# Patient Record
Sex: MALE | Race: WHITE | Employment: FULL TIME | URBAN - METROPOLITAN AREA
[De-identification: names, ages, dates, MRNs, and addresses within clinical notes are randomized per-mention and may not be internally consistent; named-entity substitution may affect disease eponyms.]

---

## 2021-10-21 PROBLEM — R56.9 SEIZURE-LIKE ACTIVITY (HCC): Status: ACTIVE | Noted: 2021-10-21

## 2021-10-21 NOTE — H&P
MICHAELLE HOSPITALIST  History and Physical     Andrei Prior Patient Status:  Emergency    1975 MRN [id number]   Location [address] [facility] Attending Gavin Pereyra [facility] Day # 0 PCP Kaya Feliz MD     Chief Co Normocephalic atraumatic. Moist mucous membranes. EOM-I. PERRLA. Anicteric. Neck: No lymphadenopathy. No JVD. No carotid bruits. Respiratory: Clear to auscultation bilaterally. No wheezes. No rhonchi. Cardiovascular: S1, S2. Regular rate and rhythm.  No Prophylaxis: Lovenox  · CODE status: Full  · [place]: None  · If COVID testing is negative, may discontinue isolation: Yes     Plan of care discussed with Patient.     Shantell Morris MD  10/21/2021

## 2021-10-21 NOTE — ED INITIAL ASSESSMENT (HPI)
Pt arrives reporting working at home and suddenly he became weak and could not remember anything. Per pt's wife, pt has a history of seizures and she heard a rattling sound behind the door of the room pt was working in.  Pt arrives AOx4 ambulatory and stron

## 2021-10-21 NOTE — ED PROVIDER NOTES
Patient Seen in: [facility] Emergency Department      History   Patient presents with:  Seizure Disorder    Stated Complaint: Seizure    Subjective:   HPI    56-year-old male presents for evaluation after a possible seizure.   Wife states the patient apparent distress  HEENT: Atraumatic, normocephalic. Pupils equal reactive. Extraocular motions intact. Oropharynx clear. Neck: Supple  Lungs: Clear to auscultation bilaterally. Heart: Regular rate and rhythm. Abdomen: Soft, nontender.    Skin: No ra MDM      Medications   sodium chloride 0.9% IV bolus 1,000 mL (1,000 mL Intravenous New Bag 10/21/21 1322)     Discussed with Dr Peg Crandall with Dr Zarina Cote.  Recommend continuous EEG monitoring/ICU    Tech paged stat    Discussed with Dr Tiffany Gillespie

## 2021-10-22 NOTE — PLAN OF CARE
NURSING ADMISSION NOTE      Patient admitted via Cart  Oriented to room. Safety precautions initiated. Bed in low position. Call light in reach.       Problem: Diabetes/Glucose Control  Goal: Glucose maintained within prescribed range  Description: I and description of seizure to MD/LIP  - If seizure occurs, turn patient to side and suction secretions as needed  - Reorient patient post seizure  - Seizure pads on all 4 side rails  - Instruct patient/family to notify RN of any seizure activity  - Instruc

## 2021-10-22 NOTE — CONSULTS
Neurology H&P    Nighat Melara Patient Status:  Inpatient    1975 MRN [id number]   [facility] 7NE-A Attending Flavio Hernandez MD   Hosp Day # 1 PCP Chetna Jordan MD     Subjective:  Nighat Melara is a(n) 42year old male past mouth daily. , Disp: , Rfl:   linaGLIPtin-metFORMIN HCl (JENTADUETO) 2.5-1000 MG Oral Tab, Take 1 tablet by mouth 2 (two) times a day., Disp: , Rfl:   Multiple Vitamin (TAB-A-TRISH) Oral Tab, Take 1 tablet by mouth daily. , Disp: , Rfl:   BIOTIN MAXIMUM OR, T throughout.      Sensory Exam   Light touch normal.   Right arm proprioception: normal  Left arm proprioception: normal    Gait, Coordination, and Reflexes     Coordination   Finger to nose coordination: normal    Tremor   Resting tremor: absent  Intention

## 2021-10-22 NOTE — PAYOR COMM NOTE
--------------  ADMISSION REVIEW     Payor: Santy Lopez 150 PPO  Subscriber #:  [id number]  Authorization Number: [id number]    Admit date: 10/21/21  Admit time:  7:02 PM       History   HPI  57-year-old male presents for evaluation after a possible seizure.   Wife limits   URINALYSIS WITH CULTURE REFLEX - Abnormal; Notable for the following components:    Ketones Urine Trace (*)     Protein Urine 30  (*)     All other components within normal limits   POCT GLUCOSE - Abnormal; Notable for the following components: pulses. Chest and Back: No tenderness or deformity. Abdomen: Soft, nontender, nondistended. Positive bowel sounds. No rebound, guarding or organomegaly. Neurologic: No focal neurological deficits. CNII-XII grossly intact.   Musculoskeletal: Moves all e witnessed seizure.  Continuous EEG monitoring if ok with neuro, neurology evaluation, continue on cardiac telemetry.     #Diabetes mellitus type 2: Insulin correction factor     #Transaminitis     MEDICATIONS ADMINISTERED IN LAST 1 DAY:  enoxaparin (Sean Sierra

## 2021-10-22 NOTE — PROGRESS NOTES
[facility]     Hospitalist Progress Note     Sung Spray Patient Status:  Inpatient    1975 MRN [id number]   [facility] 7NE-A Attending Evette Aldrich MD   Hosp Day # 1 PCP Nichelle Fan MD     Chief Complaint: Syncope Subcutaneous Daily   • Insulin Aspart Pen  1-10 Units Subcutaneous TID AC and HS       Assessment & Plan:      #Syncope versus seizure versus presyncope: Etiology is unclear, he has had multiple events over the past 5 months, initially with a witnessed sei

## 2021-10-22 NOTE — PLAN OF CARE
Assumed care at 0730  A&Ox4  NSR on tele  Denies pain  Seizure precautions maintained  24 EEG in place  MRI ordered, screening completed  Will continue to monitor

## 2021-10-23 NOTE — PROCEDURES
[facility] - ELECTROENCEPHALOGRAM (EEG) REPORT  Patient Name:  Bonnell Boeck   MRN / CSN:  [de-identified] / [id number]   Date of Birth / Age:  9/6/1975 /  42year old   Encounter Date:  10/22/2021         METHODS:  Twenty-two electrodes were applied according to th Recording to stop.     Report covers  Start October 22, 2021 at 8:39 AM  End October 23, 2021 at 8:29 AM    SIGNATURES:  Denise Bates MD   [facility] Neurology

## 2021-10-23 NOTE — PROGRESS NOTES
I personally reviewed MRI. There appears to atrophy of the right temporal lobe around the temporal horn of the lateral ventricle. Starting keppra at 500 mg bid. Informed him not to drive until I re-evaluate him in clinic as an outpatient.  He expressed under

## 2021-10-23 NOTE — PLAN OF CARE
Up ad johnny. No signs of seizure activity  Eeg completed this morning  Dr. Greg Nuno to read, PO keppra started  Awaiting brain MRI to be done  Pt refusing scds, tele, , and lovenox. Ambulating in room  Wife has many concerns, addressed as many as able.  [facility]

## 2021-10-23 NOTE — PROGRESS NOTES
[facility]     Hospitalist Progress Note     Lewis Payal Patient Status:  Inpatient    1975 MRN [id number]   [facility] 7NE-A Attending Sharon Boss MD   Hosp Day # 2 PCP Demetrius Roblero MD     Chief Complaint: Syncope Subcutaneous Daily   • Insulin Aspart Pen  1-10 Units Subcutaneous TID AC and HS       Assessment & Plan:      #Syncope versus seizure versus presyncope: Etiology is unclear, he has had multiple events over the past 5 months, initially with a witnessed sei

## 2021-10-23 NOTE — PROGRESS NOTES
[id number] Progress Note    Layla Camacho Patient Status:  Inpatient    1975 MRN [id number]   [facility] 7NE-A Attending Chau Salmeron MD   Hosp Day # 2 PCP Herminia Cohn MD     CC: period of unresponsiveness 40 MG/0.4ML injection 40 mg, 40 mg, Subcutaneous, Daily  •  acetaminophen (TYLENOL) tab 650 mg, 650 mg, Oral, Q6H PRN  •  melatonin tab 3 mg, 3 mg, Oral, Nightly PRN  •  ondansetron (ZOFRAN) injection 4 mg, 4 mg, Intravenous, Q6H PRN  •  Prochlorperazine E

## 2021-10-24 NOTE — DISCHARGE SUMMARY
[facility] HOSPITALIST  DISCHARGE SUMMARY     Nereyda Francois Patient Status:  Inpatient    1975 MRN [id number]   [facility] 7NE-A Attending No att. providers found   Hosp Day # 2 PCP Milton Reyes MD     Date of Admission: 10/21/2021 · None    Consultants:  • Neuro    Discharge Medication List:     Discharge Medications      START taking these medications      Instructions Prescription details   levETIRAcetam 500 MG Tabs  Commonly known as: KEPPRA      Take 1 tablet (500 mg total) by edema.  -----------------------------------------------------------------------------------------------  PATIENT DISCHARGE INSTRUCTIONS: See electronic chart    Jovanna Ramirez MD    Time spent:  > 30 minutes

## 2021-10-25 NOTE — PAYOR COMM NOTE
--------------  DISCHARGE REVIEW    Payor: MATEUSZ ALLEN  Subscriber #:  [id number]  Authorization Number: [id number]    Admit date: 10/21/21  Admit time:   7:02 PM  Discharge Date: 10/23/2021  5:30 PM     Admitting Physician: Val Gaona MD  Attending y telemetry unit with a syncopal event, cardiac monitoring showed no acute events. Patient was evaluated by neurology, underwent initial CT and MRI, suspect underlying epileptic seizures.   Patient started on antiepileptic drug therapy and cleared for discha [id number]  [facility] [address]  [phone]    In 1 week      Appointments for Next 30 Days 10/24/2021 - 11/23/2021            None          Vital signs:  Temp:  [97.8 °F (36.6 °C)] 97.8 °F (36.6 °C)  Pulse:  [80] 80  Resp:  [16] 16  BP: (116)/(79) 116

## 2021-11-09 NOTE — PROGRESS NOTES
Subjective:   Patricia Casas is a 42year old male who presents for Hospital F/U (Seizure )     Patient presents in follow-up for suspected seizures. Denies any events of loss of consciousness, unilateral shaking, or abnormal sensorium since discharge. Weight as of this encounter: 233 lb (105.7 kg). Neurologic Exam     Mental Status   Oriented to person, place, and time. Attention: normal. Concentration: normal.   Speech: speech is normal   Level of consciousness: alert  Knowledge: good.    Normal comp

## 2021-11-09 NOTE — PROGRESS NOTES
10/21/21 ED Seizure- Patient has not experienced any seizure like activity since ED. Patient is taking KEPPRA 500mg BID.

## 2022-01-10 NOTE — TELEPHONE ENCOUNTER
From: Aleida Cohn  To: Randy Zelaya MD  Sent: 1/7/2022 6:40 PM CST  Subject: Question    Good Day,  I need to ask if it is better to take these Vitamins Biotin, B12, D and Ginkobiloba? I am taking now Centrum.     Also I am taking the [place] shot

## 2022-01-28 NOTE — TELEPHONE ENCOUNTER
To: [facility] NURSE      From: Murtaza Martinez      Created: 1/28/2022 1:43 PM        I have a question please.  couple of people recommended I should take Vitamin Ginkgo Biloba saying this will have good impact on my concentration and memory for wor

## 2022-05-13 ENCOUNTER — OFFICE VISIT (OUTPATIENT)
Dept: NEUROLOGY | Facility: CLINIC | Age: 47
End: 2022-05-13
Payer: COMMERCIAL

## 2022-05-13 VITALS
WEIGHT: 224 LBS | DIASTOLIC BLOOD PRESSURE: 60 MMHG | HEIGHT: 67 IN | BODY MASS INDEX: 35.16 KG/M2 | HEART RATE: 73 BPM | RESPIRATION RATE: 16 BRPM | OXYGEN SATURATION: 98 % | SYSTOLIC BLOOD PRESSURE: 100 MMHG

## 2022-05-13 DIAGNOSIS — G40.009 PARTIAL IDIOPATHIC EPILEPSY WITH SEIZURES OF LOCALIZED ONSET, NOT INTRACTABLE, WITHOUT STATUS EPILEPTICUS (HCC): Primary | ICD-10-CM

## 2022-05-13 PROCEDURE — 3008F BODY MASS INDEX DOCD: CPT | Performed by: HOSPITALIST

## 2022-05-13 PROCEDURE — 3074F SYST BP LT 130 MM HG: CPT | Performed by: HOSPITALIST

## 2022-05-13 PROCEDURE — 99213 OFFICE O/P EST LOW 20 MIN: CPT | Performed by: HOSPITALIST

## 2022-05-13 PROCEDURE — 3078F DIAST BP <80 MM HG: CPT | Performed by: HOSPITALIST

## 2022-05-13 RX ORDER — LEVETIRACETAM 500 MG/1
TABLET ORAL
COMMUNITY
Start: 2022-03-29 | End: 2022-05-13

## 2022-05-13 RX ORDER — LEVETIRACETAM 500 MG/1
500 TABLET ORAL 2 TIMES DAILY
Qty: 120 TABLET | Refills: 5 | Status: SHIPPED | OUTPATIENT
Start: 2022-05-13 | End: 2022-05-16

## 2022-05-14 ENCOUNTER — TELEPHONE (OUTPATIENT)
Dept: NEUROLOGY | Facility: CLINIC | Age: 47
End: 2022-05-14

## 2022-05-14 NOTE — TELEPHONE ENCOUNTER
Wife called stating her  has a minor seizure today    Hx of partial seizure on Keppra 500 mg BID and had last episode about 6 months    Family is going to [place] next month so was doing some packing    Had an episode today where patient forgot where he put his clothes and then when his wife asked him he couldn't give her an answer and staring at her for few seconds. He did put his clothes in the bag but couldn't recall. Later he said he was just joking. Wife is concerned if this was a seizure aura. States had episode of staring before a big seizure. Compliant with Keppra    ? Not sure if this was a true minor episode    I suggested they increase the evening dose of Keppra to  750 mg and continue 500 mg AM. Monitor and call us if there is any recurrence.    Call Dr Zabrina Roth on Monday

## 2022-05-16 ENCOUNTER — TELEPHONE (OUTPATIENT)
Dept: NEUROLOGY | Facility: CLINIC | Age: 47
End: 2022-05-16

## 2022-05-16 RX ORDER — LEVETIRACETAM 500 MG/1
TABLET ORAL
Qty: 1 TABLET | Refills: 0 | COMMUNITY
Start: 2022-05-16

## 2022-05-16 NOTE — TELEPHONE ENCOUNTER
i'm taking 750 keppra in am and 750 keppra in pm.  Directed from call from office on Friday. Can Dr. Sauarbh Hurt direct  as to keep taking keppra dosage 750? ore reduce back down.

## 2022-05-16 NOTE — TELEPHONE ENCOUNTER
Discussed situation with the patient. Okay with increasing dose to 750 mg twice daily. I informed the patient to reach out to me if he runs out of medication. I am not totally certain if this was a seizure. However, wife states that he lost focus for at least 5 seconds.

## 2022-05-19 ENCOUNTER — TELEPHONE (OUTPATIENT)
Dept: NEUROLOGY | Facility: CLINIC | Age: 47
End: 2022-05-19

## 2022-05-19 NOTE — TELEPHONE ENCOUNTER
LMTCB to r/s 11/11/22 appt in [place] with another provider; Dr gutierrez; informed pt appt canceled

## 2022-05-23 ENCOUNTER — TELEPHONE (OUTPATIENT)
Dept: SURGERY | Facility: CLINIC | Age: 47
End: 2022-05-23

## 2022-05-23 ENCOUNTER — TELEPHONE (OUTPATIENT)
Dept: NEUROLOGY | Facility: CLINIC | Age: 47
End: 2022-05-23

## 2022-05-23 DIAGNOSIS — G40.909 SEIZURE DISORDER (HCC): Primary | ICD-10-CM

## 2022-05-23 NOTE — TELEPHONE ENCOUNTER
Patient wife calling and would like to talk directly to Dr. Saurabh Hurt, not nursing. Patient does not want  to know she is calling, has a question of something she noticed.

## 2022-05-25 NOTE — TELEPHONE ENCOUNTER
Patients spouse calling to speak directly with Lise Messina again and would like to \"remind him of their agreement that was discussed yesterday. \" Patients spouse would not give any further information.  Please contact to assist.

## 2022-05-25 NOTE — TELEPHONE ENCOUNTER
I have already had a prolonged (20 minutes) conversation with her yesterday. I am in clinic and do not have the time to speak with her. I will inform the  of the new orders.

## 2022-05-26 ENCOUNTER — PATIENT MESSAGE (OUTPATIENT)
Dept: NEUROLOGY | Facility: CLINIC | Age: 47
End: 2022-05-26

## 2022-05-26 NOTE — TELEPHONE ENCOUNTER
From: [facility] Room  To: Kristen Evans MD  Sent: 5/26/2022 12:15 PM CDT  Subject: Call    Hello, I returned the call I got from Dr. Deion Cheema and still waiting for him to call me.  [facility]

## 2022-05-27 ENCOUNTER — PATIENT MESSAGE (OUTPATIENT)
Dept: NEUROLOGY | Facility: CLINIC | Age: 47
End: 2022-05-27

## 2022-05-27 DIAGNOSIS — G40.909 SEIZURE DISORDER (HCC): Primary | ICD-10-CM

## 2022-05-27 DIAGNOSIS — G40.009 PARTIAL IDIOPATHIC EPILEPSY WITH SEIZURES OF LOCALIZED ONSET, NOT INTRACTABLE, WITHOUT STATUS EPILEPTICUS (HCC): ICD-10-CM

## 2022-05-27 NOTE — TELEPHONE ENCOUNTER
From: Michael Zheng  To: Kirt Orellaan MD  Sent: 5/26/2022 12:15 PM CDT  Subject: Call    Hello, I returned the call I got from Dr. Frank Tyler and still waiting for him to call me.  [facility]

## 2022-05-27 NOTE — TELEPHONE ENCOUNTER
Tell him I ordered an EEG to be performed prior to his trip to [facility].

## 2022-06-01 RX ORDER — LEVETIRACETAM 750 MG/1
750 TABLET ORAL 2 TIMES DAILY
Qty: 180 TABLET | Refills: 1 | Status: SHIPPED | OUTPATIENT
Start: 2022-06-01

## 2022-06-01 NOTE — TELEPHONE ENCOUNTER
Per Shirat - patient requesting refill of Keppra. Pt states he is taking 3 tablets of Keppra per day. Per Epic, last increase noted on 5/16/2022 to 750 mg Keppra BID. Sent patient a message to clarify when he increased to 750 mg 3 tablets per day. Good Morning, I need refill for Keppra. I am taking now 3 tablets a day and Dr. Michael Rae knows I will be traveling to [facility] on June 12 for a month so will need extra to cover my trip. thanks.

## 2022-06-01 NOTE — TELEPHONE ENCOUNTER
Pended 750 mg tablet BID for patient. Patient increased to 750 mg BID on 5/16/2022. Routed to provider. Thank you for your message, will be easier for sure to take 2 tablets a day so yes please increase the dose but please send me enough tablets to cover my international trip.   [place]

## 2022-06-02 NOTE — TELEPHONE ENCOUNTER
Pt's wife calling back; Dr asked her to call back with an update in 1 week; no other information given; pt's wife would like to speak directly with Dr' pt is unaware wife is calling.

## 2022-06-02 NOTE — TELEPHONE ENCOUNTER
I will wait for the results to speak with her. There is nothing to add and I do not feel comfortable speaking with her in secret.

## 2022-06-03 NOTE — TELEPHONE ENCOUNTER
Informed patient;s wife that information was forwarded to provider and they will be contacted with EEG results when they are received and interpreted. Verbalized understanding.

## 2022-06-03 NOTE — TELEPHONE ENCOUNTER
LM for patient wife per consent to call office. Inform that information will be forwarded to provider as FYI and that provider has stated he is not comfortable communicating with her in secret and they will hear from this office after EEG results are reviewed.

## 2022-06-03 NOTE — TELEPHONE ENCOUNTER
Pt's wife called back; informed her that Dr waiting for EEG results; pt's wife sates he is doing better since increasing dose; the only thing that she is noticing since increasing medication is that he is short episodes of memory loss; pt's wife would also like Dr to talk to pt about any limitations for his upcoming trip; pt's wife states she does not want  to tell pt she has been calling because it would \"make things difficult\" for her

## 2022-06-06 ENCOUNTER — NURSE ONLY (OUTPATIENT)
Dept: ELECTROPHYSIOLOGY | Facility: HOSPITAL | Age: 47
End: 2022-06-06
Attending: HOSPITALIST
Payer: COMMERCIAL

## 2022-06-06 DIAGNOSIS — G40.909 SEIZURE DISORDER (HCC): ICD-10-CM

## 2022-06-06 PROCEDURE — 95816 EEG AWAKE AND DROWSY: CPT | Performed by: HOSPITALIST

## 2022-06-07 ENCOUNTER — TELEPHONE (OUTPATIENT)
Dept: NEUROLOGY | Facility: CLINIC | Age: 47
End: 2022-06-07

## 2022-06-07 ENCOUNTER — PATIENT MESSAGE (OUTPATIENT)
Dept: NEUROLOGY | Facility: CLINIC | Age: 47
End: 2022-06-07

## 2022-06-07 NOTE — TELEPHONE ENCOUNTER
Patient's wife notified of EEG results and dr Kelly Hollins recommendations. Patient's wife states she appreciates call and is very pleased with Dr Kelly Hollins care and she is praying for him.

## 2022-06-07 NOTE — PROCEDURES
[facility] - ELECTROENCEPHALOGRAM (EEG) REPORT  Patient Name:  Kevin Orr   MRN / CSN:  [id number] / [id number]   Date of Birth / Age:  9/6/1975 /  42year old   Encounter Date:  06/06/2022         METHODS:  Twenty-two electrodes were applied according to the 10-20-electrode placement system on this routine audio-video EEG. EKG monitoring, monopolar and bipolar montages are routinely utilized. The record was obtained on a digital system. OBJECT:  This is a 42year old year-old male with a PMH of seizures    The EEG was requested to assess for epileptiform activity and change in mental status. State(s) of consciousness: awake and drowsy    Relevant medications:   levETIRAcetam 750 MG Oral Tab, Take 1 tablet (750 mg total) by mouth 2 (two) times daily. , Disp: 180 tablet, Rfl: 1  glimepiride 1 MG Oral Tab, Take 1 mg by mouth daily with breakfast., Disp: , Rfl:   lisinopril 10 MG Oral Tab, Take 10 mg by mouth daily. , Disp: , Rfl:   linaGLIPtin-metFORMIN HCl (JENTADUETO) 2.5-1000 MG Oral Tab, Take 1 tablet by mouth 2 (two) times a day., Disp: , Rfl:   Multiple Vitamin (TAB-A-TRISH) Oral Tab, Take 1 tablet by mouth daily. , Disp: , Rfl:   BIOTIN MAXIMUM OR, Take 1 tablet by mouth daily. , Disp: , Rfl:     No current facility-administered medications on file prior to visit. FINDINGS:  In the maximally alert state there was a bilateral and reactive PDR at roughly 10-11 Hz that appeared symmetric and reactive. Background in this state was well organized and largely consisted of alpha frequencies with an AP gradient. Transition to drowsiness was characterized by background slowing. No clear ictal or interictal discharges were appreciated. No clinical events were documented for review that would be consistent with a seizure. Background was showing reactivity and variability throughout the recording. IMPRESSION:  This was an unrevealing awake and drowsy routine EEG. No clear seizures or seizure tendency captured. SIGNATURES:  Anabella Jordan MD   [facility] Neurology

## 2022-06-08 NOTE — TELEPHONE ENCOUNTER
From: Michelle Gayle  Sent: 6/7/2022 5:41 PM CDT  To: Tamsen Peabody Nurse  Subject: Call    Good Day,  I did the EEG yesterday and was wondering if Dr. Wendy Glass has it and has any feedback to me.   [facility]

## 2022-06-10 ENCOUNTER — PATIENT MESSAGE (OUTPATIENT)
Dept: NEUROLOGY | Facility: CLINIC | Age: 47
End: 2022-06-10

## 2022-06-13 NOTE — TELEPHONE ENCOUNTER
From: Rosemarie Ayon  To: Milagros Mclean MD  Sent: 5/26/2022 12:15 PM CDT  Subject: Call    Hello, I returned the call I got from Dr. Stephanie Bentley and still waiting for him to call me.  [facility]

## 2022-07-13 ENCOUNTER — PATIENT MESSAGE (OUTPATIENT)
Dept: NEUROLOGY | Facility: CLINIC | Age: 47
End: 2022-07-13

## 2022-07-21 NOTE — TELEPHONE ENCOUNTER
To: [facility] NURSE      From: Torrie Luna      Created: 7/21/2022 10:05 AM      Juan Carlos Mcfarland, I waited for Dr. Derrick Cruz call last Friday. Any chance he will call me today or tomorrow?   [facility]

## 2022-07-21 NOTE — TELEPHONE ENCOUNTER
From: Scott Culp  To: Dhiraj Granger MD  Sent: 5/26/2022 12:15 PM CDT  Subject: Call    Hello, I returned the call I got from Dr. Todd Asif and still waiting for him to call me.  [facility]

## 2022-10-01 DIAGNOSIS — G40.009 PARTIAL IDIOPATHIC EPILEPSY WITH SEIZURES OF LOCALIZED ONSET, NOT INTRACTABLE, WITHOUT STATUS EPILEPTICUS (HCC): ICD-10-CM

## 2022-10-01 DIAGNOSIS — G40.909 SEIZURE DISORDER (HCC): ICD-10-CM

## 2022-10-03 RX ORDER — LEVETIRACETAM 750 MG/1
TABLET ORAL
Qty: 180 TABLET | Refills: 0 | Status: SHIPPED | OUTPATIENT
Start: 2022-10-03

## 2022-11-10 ENCOUNTER — OFFICE VISIT (OUTPATIENT)
Dept: NEUROLOGY | Facility: CLINIC | Age: 47
End: 2022-11-10
Payer: COMMERCIAL

## 2022-11-10 VITALS
HEART RATE: 70 BPM | BODY MASS INDEX: 36 KG/M2 | SYSTOLIC BLOOD PRESSURE: 122 MMHG | DIASTOLIC BLOOD PRESSURE: 80 MMHG | WEIGHT: 229 LBS | RESPIRATION RATE: 16 BRPM

## 2022-11-10 DIAGNOSIS — G40.909 SEIZURE DISORDER (HCC): ICD-10-CM

## 2022-11-10 DIAGNOSIS — G40.009 PARTIAL IDIOPATHIC EPILEPSY WITH SEIZURES OF LOCALIZED ONSET, NOT INTRACTABLE, WITHOUT STATUS EPILEPTICUS (HCC): ICD-10-CM

## 2022-11-10 PROCEDURE — 99214 OFFICE O/P EST MOD 30 MIN: CPT | Performed by: OTHER

## 2022-11-10 PROCEDURE — 3079F DIAST BP 80-89 MM HG: CPT | Performed by: OTHER

## 2022-11-10 PROCEDURE — 3074F SYST BP LT 130 MM HG: CPT | Performed by: OTHER

## 2022-11-10 RX ORDER — SITAGLIPTIN AND METFORMIN HYDROCHLORIDE 1000; 50 MG/1; MG/1
1 TABLET, FILM COATED ORAL 2 TIMES DAILY
COMMUNITY
Start: 2022-10-22

## 2022-11-10 RX ORDER — LEVETIRACETAM 750 MG/1
750 TABLET ORAL 2 TIMES DAILY
Qty: 180 TABLET | Refills: 3 | Status: SHIPPED | OUTPATIENT
Start: 2022-11-10

## 2023-09-22 ENCOUNTER — TELEPHONE (OUTPATIENT)
Dept: NEUROLOGY | Facility: CLINIC | Age: 48
End: 2023-09-22

## 2023-09-22 ENCOUNTER — LAB ENCOUNTER (OUTPATIENT)
Dept: LAB | Age: 48
End: 2023-09-22
Attending: Other
Payer: COMMERCIAL

## 2023-09-22 DIAGNOSIS — G40.909 SEIZURE DISORDER (HCC): Primary | ICD-10-CM

## 2023-09-22 DIAGNOSIS — G40.909 SEIZURE DISORDER (HCC): ICD-10-CM

## 2023-09-22 PROCEDURE — 80177 DRUG SCRN QUAN LEVETIRACETAM: CPT | Performed by: OTHER

## 2023-09-22 NOTE — TELEPHONE ENCOUNTER
Pt is calling in regards to current medication levetiracetam 750 MG Oral Tab, questioning whether or not he should up his dosage; Please advise

## 2023-09-22 NOTE — TELEPHONE ENCOUNTER
Spoke to patient who states he has had occasional episodes of starring off while talking with his wife. He is concerned that the medication is not high enough. No lab obtained previously.    Future Appointments   Date Time Provider Mik Doyle   12/22/2023 11:00 AM Esperanza Turner MD [facility] MARTIN Gipson

## 2023-09-25 LAB — LEVETIRACETAM LVL: 20 UG/ML

## 2023-12-22 ENCOUNTER — OFFICE VISIT (OUTPATIENT)
Dept: NEUROLOGY | Facility: CLINIC | Age: 48
End: 2023-12-22
Payer: COMMERCIAL

## 2023-12-22 VITALS
RESPIRATION RATE: 16 BRPM | DIASTOLIC BLOOD PRESSURE: 70 MMHG | SYSTOLIC BLOOD PRESSURE: 124 MMHG | WEIGHT: 229.19 LBS | BODY MASS INDEX: 36 KG/M2 | HEART RATE: 76 BPM

## 2023-12-22 DIAGNOSIS — G40.909 SEIZURE DISORDER (HCC): Primary | ICD-10-CM

## 2023-12-22 PROCEDURE — 99214 OFFICE O/P EST MOD 30 MIN: CPT | Performed by: OTHER

## 2023-12-22 PROCEDURE — 3074F SYST BP LT 130 MM HG: CPT | Performed by: OTHER

## 2023-12-22 PROCEDURE — 3078F DIAST BP <80 MM HG: CPT | Performed by: OTHER

## 2024-08-17 DIAGNOSIS — G40.909 SEIZURE DISORDER (HCC): Primary | ICD-10-CM

## 2024-08-19 RX ORDER — LEVETIRACETAM 1000 MG/1
1000 TABLET ORAL 2 TIMES DAILY
Qty: 180 TABLET | Refills: 1 | Status: SHIPPED | OUTPATIENT
Start: 2024-08-19

## 2024-08-19 NOTE — TELEPHONE ENCOUNTER
Medication:  LEVETIRACETAM 1000 MG Oral Tab      Date of last refill: 09/26/2023 (#180/3)  [place]     Last office visit: 12/22/2023  Due back to clinic per last office note:  Around 12/22/2024  Date next office visit scheduled:    No future appointments.        Last OV note recommendation:    Plan:  Cont keppra 1000 mg bid, refills sent  Check level  Avoid sleep deprivation, dehydration  See orders and medications filed with this encounter. The patient indicates understanding of these issues and agrees with the plan.  Discussed with patien regarding assessment, care plan  RTC 12 months  Pt should go ER for any new or worsening symptoms and contact office

## 2024-11-08 ENCOUNTER — OFFICE VISIT (OUTPATIENT)
Dept: NEUROLOGY | Facility: CLINIC | Age: 49
End: 2024-11-08
Payer: COMMERCIAL

## 2024-11-08 VITALS
HEART RATE: 76 BPM | DIASTOLIC BLOOD PRESSURE: 62 MMHG | OXYGEN SATURATION: 96 % | BODY MASS INDEX: 36 KG/M2 | SYSTOLIC BLOOD PRESSURE: 100 MMHG | WEIGHT: 230 LBS

## 2024-11-08 DIAGNOSIS — G40.909 SEIZURE DISORDER (HCC): ICD-10-CM

## 2024-11-08 PROBLEM — R56.9 SEIZURE-LIKE ACTIVITY (HCC): Status: RESOLVED | Noted: 2021-10-21 | Resolved: 2024-11-08

## 2024-11-08 PROCEDURE — 99214 OFFICE O/P EST MOD 30 MIN: CPT | Performed by: OTHER

## 2024-11-08 RX ORDER — MINOXIDIL 2.5 MG/1
2.5 TABLET ORAL DAILY
COMMUNITY
Start: 2024-10-30

## 2024-11-08 RX ORDER — LEVETIRACETAM 1000 MG/1
1000 TABLET ORAL 2 TIMES DAILY
Qty: 180 TABLET | Refills: 3 | Status: SHIPPED | OUTPATIENT
Start: 2024-11-08

## 2024-11-08 NOTE — PATIENT INSTRUCTIONS
Refill policies:    Allow 2-3 business days for refills; controlled substances may take longer.  Contact your pharmacy at least 5 days prior to running out of medication and have them send an electronic request or submit request through the “request refill” option in your [employer] account.  Refills are not addressed on weekends; covering physicians do not authorize routine medications on weekends.  No narcotics or controlled substances are refilled after noon on Fridays or by on call physicians.  By law, narcotics must be electronically prescribed.  A 30 day supply with no refills is the maximum allowed.  If your prescription is due for a refill, you may be due for a follow up appointment.  To best provide you care, patients receiving routine medications need to be seen at least once a year.  Patients receiving narcotic/controlled substance medications need to be seen at least once every 3 months.  In the event that your preferred pharmacy does not have the requested medication in stock (e.g. Backordered), it is your responsibility to find another pharmacy that has the requested medication available.  We will gladly send a new prescription to that pharmacy at your request.    Scheduling Tests:    If your physician has ordered radiology tests such as MRI or CT scans, please contact Central Scheduling at [phone] right away to schedule the test.  Once scheduled, the [facility] Centralized Referral Team will work with your insurance carrier to obtain pre-certification or prior authorization.  Depending on your insurance carrier, approval may take 3-10 days.  It is highly recommended patients assure they have received an authorization before having a test performed.  If test is done without insurance authorization, patient may be responsible for the entire amount billed.      Precertification and Prior Authorizations:  If your physician has recommended that you have a procedure or additional testing performed the [facility]  Centralized Referral Team will contact your insurance carrier to obtain pre-certification or prior authorization.    You are strongly encouraged to contact your insurance carrier to verify that your procedure/test has been approved and is a COVERED benefit.  Although the [facility] Centralized Referral Team does its due diligence, the insurance carrier gives the disclaimer that \"Although the procedure is authorized, this does not guarantee payment.\"    Ultimately the patient is responsible for payment.   Thank you for your understanding in this matter.  Paperwork Completion:  If you require FMLA or disability paperwork for your recovery, please make sure to either drop it off or have it faxed to our office at [phone]. Be sure the form has your name and date of birth on it.  The form will be faxed to our Forms Department and they will complete it for you.  There is a 25$ fee for all forms that need to be filled out.  Please be aware there is a 10-14 day turnaround time.  You will need to sign a release of information (ASHLEY) form if your paperwork does not come with one.  You may call the Forms Department with any questions at [phone].  Their fax number is [phone].

## 2024-11-08 NOTE — PROGRESS NOTES
[facility] Neurology Outpatient Progress Note  Date of service: 11/8/2024    Assessment:     ICD-10-CM    1. Seizure disorder (HCC)  G40.909 levetiracetam 1000 MG Oral Tab        Seizure disorder (HCC); stable on keppra      Plan:  Cont keppra 1000 mg bid, refills sent  Check level  Avoid sleep deprivation, dehydration  See orders and medications filed with this encounter. The patient indicates understanding of these issues and agrees with the plan.  Discussed with patien regarding assessment, care plan  RTC 12 months  Pt should go ER for any new or worsening symptoms and contact office     Subjective:   History:  Patient here for a follow-up visit for seizure. No seizure since last visit. Tolerating keppra 1gm bid well.  No side effect reported from keppra.  He travels a lot.  He is a former pt of Dr Emerson.  Last keppra level is good. New lab pending.     History/Other:   REVIEW OF SYSTEMS:  A 10-point system was reviewed. Pertinent positives and negatives are noted as above       Current Outpatient Medications:     minoxidil 2.5 MG Oral Tab, Take 1 tablet (2.5 mg total) by mouth daily., Disp: , Rfl:     levetiracetam 1000 MG Oral Tab, Take 1 tablet (1,000 mg total) by mouth 2 (two) times daily., Disp: 180 tablet, Rfl: 3    JANUMET  MG Oral Tab, Take 1 tablet by mouth 2 (two) times daily., Disp: , Rfl:     glimepiride 1 MG Oral Tab, Take 1 tablet (1 mg total) by mouth daily with breakfast., Disp: , Rfl:     lisinopril 10 MG Oral Tab, Take 1 tablet (10 mg total) by mouth daily., Disp: , Rfl:     Multiple Vitamin (TAB-A-TRISH) Oral Tab, Take 1 tablet by mouth daily., Disp: , Rfl:     BIOTIN MAXIMUM OR, Take 1 tablet by mouth daily., Disp: , Rfl:   Allergies:  Allergies[1]  Past Medical History:    Diabetes (HCC)    Seizure disorder (HCC)     History reviewed. No pertinent surgical history.  Social History:  Social History     Tobacco Use    Smoking status: Never    Smokeless tobacco: Never   Substance Use Topics     Alcohol use: Yes     Alcohol/week: 5.0 standard drinks of alcohol     Types: 2 Glasses of wine, 3 Cans of beer per week     Comment: occasionally      Family history:  Patient denies any pertinent family history associated with the current problem    Objective:   Neurological Examination:  /62   Pulse 76   Wt 230 lb (104.3 kg)   SpO2 96%   BMI 36.02 kg/m²   Mental status: A & O X 3  Language: no aphasia  Speech: no dysarthria  CN II-XII: intact   Motor strength: 5/5 all extremities  Tone: normal  DTRs: + symmetric  Coordination: normal  Sensory: symmetric  Gait: normal    Test reviewed on 11/8/2024      Victorina \"Shiv\"MD Yasmani  Neurology  [facility]  11/8/2024, 8:32 AM  CC: Slim Freeman MD         [1] No Known Allergies

## 2025-02-07 DIAGNOSIS — G40.909 SEIZURE DISORDER (HCC): ICD-10-CM

## 2025-02-07 RX ORDER — LEVETIRACETAM 1000 MG/1
1000 TABLET ORAL 2 TIMES DAILY
Qty: 180 TABLET | Refills: 3 | Status: CANCELLED | OUTPATIENT
Start: 2025-02-07

## 2025-02-10 NOTE — TELEPHONE ENCOUNTER
Spoke with the pharmacy who states that the patient has refills remaining, Will advise the patient.         Medication:  levetiracetam 1000 MG Oral Tab      Date of last refill: 11/08/2024 (#180/3)  [place]     Last office visit: 11/08/2024  Due back to clinic per last office note:  12 months  Date next office visit scheduled:    No future appointments.        Last OV note recommendation:    Plan:  Cont keppra 1000 mg bid, refills sent  Check level  Avoid sleep deprivation, dehydration  See orders and medications filed with this encounter. The patient indicates understanding of these issues and agrees with the plan.  Discussed with patien regarding assessment, care plan  RTC 12 months  Pt should go ER for any new or worsening symptoms and contact office

## 2025-04-09 ENCOUNTER — TELEPHONE (OUTPATIENT)
Dept: NEUROLOGY | Facility: CLINIC | Age: 50
End: 2025-04-09

## 2025-04-09 ENCOUNTER — OFFICE VISIT (OUTPATIENT)
Dept: NEUROLOGY | Facility: CLINIC | Age: 50
End: 2025-04-09
Payer: COMMERCIAL

## 2025-04-09 VITALS
SYSTOLIC BLOOD PRESSURE: 116 MMHG | HEART RATE: 92 BPM | DIASTOLIC BLOOD PRESSURE: 80 MMHG | OXYGEN SATURATION: 97 % | WEIGHT: 232 LBS | BODY MASS INDEX: 32.48 KG/M2 | RESPIRATION RATE: 16 BRPM | HEIGHT: 71 IN

## 2025-04-09 DIAGNOSIS — G40.909 SEIZURE DISORDER (HCC): Primary | ICD-10-CM

## 2025-04-09 DIAGNOSIS — G40.909 SEIZURE DISORDER (HCC): ICD-10-CM

## 2025-04-09 PROCEDURE — 99215 OFFICE O/P EST HI 40 MIN: CPT | Performed by: OTHER

## 2025-04-09 RX ORDER — LEVETIRACETAM 1000 MG/1
1500 TABLET ORAL 2 TIMES DAILY
Qty: 270 TABLET | Refills: 3 | Status: SHIPPED | OUTPATIENT
Start: 2025-04-09 | End: 2025-04-10

## 2025-04-09 RX ORDER — LEVETIRACETAM 1000 MG/1
1000 TABLET ORAL 2 TIMES DAILY
Qty: 180 TABLET | Refills: 3 | Status: CANCELLED | OUTPATIENT
Start: 2025-04-09

## 2025-04-09 NOTE — PROGRESS NOTES
[facility] Neurology Outpatient Progress Note  Date of service: 4/9/2025    The following individual(s) verbally consented to be recorded using ambient AI listening technology and understand that they can each withdraw their consent to this listening technology at any point by asking the clinician to turn off or pause the recording:    Patient name: Buzz Garcia      Assessment:     ICD-10-CM    1. Seizure disorder (HCC)  G40.909 EEG     Levetiracetam, S     levetiracetam 1000 MG Oral Tab      One seizure yesterday  Assessment & Plan  Seizure disorder  Experienced a seizure episode after 16 months seizure-free. Compliance with medication noted, but occasional late doses. Stress and sleep deprivation potential triggers. .  - Increase Levetiracetam to 1500 mg twice daily.  - Recheck blood level in 1-2 weeks   - Repeat EEG   - Advise on the importance of sleep and stress management.  - Refer to Dr. Vilchis, a seizure disorder specialist, for a second opinion if desired.  - Advise against driving until seizure-free for 6 months per [place] law.        Procedures    Levetiracetam, S     I advised that patient should not drive, operate heavy machine, climb ladder, swim, or tub bath for seizure precaution unless patient has been seizure free for > 6 months, patient verbalized understanding.  Pt should go ER for any new or worsening symptoms and contact office   A total of 40 minutes were spent on patient care,  more than 50% was spent counseling patient/family regarding studies' results, assessment, treatment options and care plan, 10 minutes were spent in (pre- and/or during visit) reviewing clinical data including imaging, labs and progress notes related to therapy or treatment.    Subjective:   History:  History of Present Illness  Buzz Garcia is a 49 year old male with a history of seizures who presents with a recent seizure episode. He is accompanied by his wife.    He experienced a seizure episode last night while going to  sleep. The episode involved shaking lasting about a minute to a minute and a half, during which he was unresponsive, with movements of his hands and feet. There was no urinary incontinence, but he had blood-tinged saliva, likely from biting his tongue.    Following the seizure, he was confused, did not recognize his wife, and was drowsy. He wandered around the room and took some time to regain full awareness. His wife contacted a neighbor who is a neurologist, who advised administering an extra dose of Keppra, which was done. He became more oriented after about five to ten minutes.    He has been on Keppra, taking one tablet twice a day, but sometimes takes it 45 minutes late. His last significant seizure episode was in October 2021, and he has been mostly seizure-free since then. He is concerned about potential triggers for the recent episode, including stress and sleep deprivation. He does not consume alcohol regularly, only occasionally at social events.    He is planning to travel out of [facility] for a business trip in two weeks and is concerned about managing his medication and seizure control during this time. He is aware of the driving restrictions due to his recent seizure.  Tolerating keppra ell.  No side effect reported from keppra.  He travels a lot.  He is a former pt of Dr Emerson.    History/Other:   REVIEW OF SYSTEMS:  A 10-point system was reviewed. Pertinent positives and negatives are noted as above     Medications - Current[1]  Allergies:  Allergies[2]  Past Medical History[3]  Past Surgical History[4]  Social History:  Social History     Tobacco Use    Smoking status: Never    Smokeless tobacco: Never   Substance Use Topics    Alcohol use: Yes     Alcohol/week: 5.0 standard drinks of alcohol     Types: 2 Glasses of wine, 3 Cans of beer per week     Comment: occasionally      Family History[5]   Patient denies any pertinent family history associated with the current problem    Objective:   Neurological  Examination:  /80   Pulse 92   Resp 16   Ht 71\"   Wt 232 lb (105.2 kg)   SpO2 97%   BMI 32.36 kg/m²   Mental status: A & O X 3  Language: no aphasia  Speech: no dysarthria  CN II-XII: intact   Motor strength: 5/5 all extremities  Tone: normal  Coordination: normal  Sensory: symmetric  Gait: normal    Test reviewed on 4/9/2025      Victorian Gruber MD  Neurology  [facility]  4/9/2025, 1:00 PM  CC: Slim Freeman MD       [1]   Current Outpatient Medications:     levetiracetam 1000 MG Oral Tab, Take 1.5 tablets (1,500 mg total) by mouth 2 (two) times daily., Disp: 270 tablet, Rfl: 3    minoxidil 2.5 MG Oral Tab, Take 1 tablet (2.5 mg total) by mouth daily., Disp: , Rfl:     JANUMET  MG Oral Tab, Take 1 tablet by mouth 2 (two) times daily., Disp: , Rfl:     glimepiride 1 MG Oral Tab, Take 1 tablet (1 mg total) by mouth daily with breakfast., Disp: , Rfl:     Multiple Vitamin (TAB-A-TRISH) Oral Tab, Take 1 tablet by mouth daily., Disp: , Rfl:     BIOTIN MAXIMUM OR, Take 1 tablet by mouth daily., Disp: , Rfl:     lisinopril 10 MG Oral Tab, Take 1 tablet (10 mg total) by mouth daily., Disp: , Rfl:   [2] No Known Allergies  [3]   Past Medical History:   Diabetes (HCC)    Seizure disorder (HCC)   [4] History reviewed. No pertinent surgical history.  [5] No family history on file.

## 2025-04-09 NOTE — PROGRESS NOTES
Patient with wife today  Patient following up on seizures   Patient does have questions for the

## 2025-04-09 NOTE — PATIENT INSTRUCTIONS
Refill policies:    Allow 2-3 business days for refills; controlled substances may take longer.  Contact your pharmacy at least 5 days prior to running out of medication and have them send an electronic request or submit request through the “request refill” option in your [employer] account.  Refills are not addressed on weekends; covering physicians do not authorize routine medications on weekends.  No narcotics or controlled substances are refilled after noon on Fridays or by on call physicians.  By law, narcotics must be electronically prescribed.  A 30 day supply with no refills is the maximum allowed.  If your prescription is due for a refill, you may be due for a follow up appointment.  To best provide you care, patients receiving routine medications need to be seen at least once a year.  Patients receiving narcotic/controlled substance medications need to be seen at least once every 3 months.  In the event that your preferred pharmacy does not have the requested medication in stock (e.g. Backordered), it is your responsibility to find another pharmacy that has the requested medication available.  We will gladly send a new prescription to that pharmacy at your request.    Scheduling Tests:    If your physician has ordered radiology tests such as MRI or CT scans, please contact Central Scheduling at [phone] right away to schedule the test.  Once scheduled, the [facility] Centralized Referral Team will work with your insurance carrier to obtain pre-certification or prior authorization.  Depending on your insurance carrier, approval may take 3-10 days.  It is highly recommended patients assure they have received an authorization before having a test performed.  If test is done without insurance authorization, patient may be responsible for the entire amount billed.      Precertification and Prior Authorizations:  If your physician has recommended that you have a procedure or additional testing performed the [facility]  Centralized Referral Team will contact your insurance carrier to obtain pre-certification or prior authorization.    You are strongly encouraged to contact your insurance carrier to verify that your procedure/test has been approved and is a COVERED benefit.  Although the [facility] Centralized Referral Team does its due diligence, the insurance carrier gives the disclaimer that \"Although the procedure is authorized, this does not guarantee payment.\"    Ultimately the patient is responsible for payment.   Thank you for your understanding in this matter.  Paperwork Completion:  If you require FMLA or disability paperwork for your recovery, please make sure to either drop it off or have it faxed to our office at [phone]. Be sure the form has your name and date of birth on it.  The form will be faxed to our Forms Department and they will complete it for you.  There is a 25$ fee for all forms that need to be filled out.  Please be aware there is a 10-14 day turnaround time.  You will need to sign a release of information (ASHLEY) form if your paperwork does not come with one.  You may call the Forms Department with any questions at [phone].  Their fax number is [phone].

## 2025-04-09 NOTE — TELEPHONE ENCOUNTER
Patient is calling stating he has a short seizure episode yesterday. Patient has been scheduled to be seen today in office.

## 2025-04-10 ENCOUNTER — TELEPHONE (OUTPATIENT)
Dept: NEUROLOGY | Facility: CLINIC | Age: 50
End: 2025-04-10

## 2025-04-10 DIAGNOSIS — G40.909 SEIZURE DISORDER (HCC): ICD-10-CM

## 2025-04-10 RX ORDER — LEVETIRACETAM 500 MG/1
500 TABLET ORAL 2 TIMES DAILY
Qty: 180 TABLET | Refills: 3 | Status: SHIPPED | OUTPATIENT
Start: 2025-04-10

## 2025-04-10 RX ORDER — LEVETIRACETAM 1000 MG/1
1000 TABLET ORAL 2 TIMES DAILY
Qty: 360 TABLET | Refills: 3 | Status: SHIPPED | OUTPATIENT
Start: 2025-04-10

## 2025-04-10 NOTE — TELEPHONE ENCOUNTER
Pt rec'd new RX for levetiracetam 1000 MG Oral Tab. Pt does not want to cut tablets. Please submit new RX's for individual tablets to meet the dosage requirement. Please advise, Pt's best call back number is [phone]. Endorsed to [facility].

## 2025-04-10 NOTE — TELEPHONE ENCOUNTER
Medication changed per request so it is not cut in half. New order placed at pharmacy for correction

## 2025-04-11 NOTE — TELEPHONE ENCOUNTER
Refused. Duplicate request.       Medication: levetiracetam 1000 MG Oral Tab      Date of last refill: 04/10/2025 (#360/3)  [place]     Last office visit: 04/09/2025  Due back to clinic per last office note:  Around 06/09/2025  Date next office visit scheduled:    Future Appointments   Date Time Provider Department Center   6/26/2025  9:00 AM Kathrin Vilchis MD [facility]           Last OV note recommendation:    Assessment:       ICD-10-CM     1. Seizure disorder (HCC)  G40.909 EEG       Levetiracetam, S       levetiracetam 1000 MG Oral Tab       One seizure yesterday  Assessment & Plan  Seizure disorder  Experienced a seizure episode after 16 months seizure-free. Compliance with medication noted, but occasional late doses. Stress and sleep deprivation potential triggers. .  - Increase Levetiracetam to 1500 mg twice daily.  - Recheck blood level in 1-2 weeks   - Repeat EEG   - Advise on the importance of sleep and stress management.  - Refer to Dr. Vilchis, a seizure disorder specialist, for a second opinion if desired.  - Advise against driving until seizure-free for 6 months per [place] law.             Procedures    Levetiracetam, S      I advised that patient should not drive, operate heavy machine, climb ladder, swim, or tub bath for seizure precaution unless patient has been seizure free for > 6 months, patient verbalized understanding.  Pt should go ER for any new or worsening symptoms and contact office   A total of 40 minutes were spent on patient care,  more than 50% was spent counseling patient/family regarding studies' results, assessment, treatment options and care plan, 10 minutes were spent in (pre- and/or during visit) reviewing clinical data including imaging, labs and progress notes related to therapy or treatment.

## 2025-06-26 ENCOUNTER — OFFICE VISIT (OUTPATIENT)
Dept: NEUROLOGY | Facility: CLINIC | Age: 50
End: 2025-06-26
Payer: COMMERCIAL

## 2025-06-26 VITALS
BODY MASS INDEX: 32 KG/M2 | WEIGHT: 227 LBS | HEART RATE: 74 BPM | RESPIRATION RATE: 16 BRPM | DIASTOLIC BLOOD PRESSURE: 72 MMHG | SYSTOLIC BLOOD PRESSURE: 118 MMHG

## 2025-06-26 DIAGNOSIS — G40.909 UNCLASSIFIED EPILEPTIC SEIZURES (HCC): Primary | ICD-10-CM

## 2025-06-26 PROCEDURE — 99215 OFFICE O/P EST HI 40 MIN: CPT | Performed by: OTHER

## 2025-06-26 RX ORDER — MIDAZOLAM 5 MG/.1ML
5 SPRAY NASAL AS NEEDED
Qty: 1 EACH | Refills: 0 | Status: SHIPPED | OUTPATIENT
Start: 2025-06-26

## 2025-06-26 NOTE — PROGRESS NOTES
Neurology History & Physical     ASSESSMENT & PLAN:      ICD-10-CM    1. 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  Possibly focal unaware seizures.  This poses threat to bodily function.  Cont LEV 1500 mg BID, check level.  We can defer EEG.  He has made lifestyle changes that should help reduce triggers.  We discussed recurrence risk and intractability, and he does not fall in that category at this time.    Seizure precaution information provided:  Until seizure-free / event-free for at least 6 consecutive months, the patient must take precautions, including but not limited to absolutely avoid driving; avoid the use of heavy machinery; avoid lifting more than 25 lbs; avoid alcohol and drug use; swim only with supervision (avoid lakes and oceans, and someone who knows your condition and can swim must be in the water with you), avoid tub baths, use only the back burners on the stove, avoid open flames/heights/rooftops; take precautions with children and infants; if exercising, avoid free weights and avoid treadmill, and have a  who knows your condition; avoid contact sports; use a helmet if riding bicycle.  Avoid common triggers such as sleep deprivation, low blood sugar, and missing medications.  Seizure rescue plan. I recommend to call ambulance if: shaking lasts > 5 minutes, if there is a reduction or cessation of breathing, the patient turns blue, or any other observer concern out of the ordinary for the patient's seizures.  Paramedics can also give rescue medication.  Every seizure does not require ER visit, especially if it is brief or within the usual pattern for the patient's seizures.  Discussed risks and benefits of treatment, including but not limited to medication side-effects, including possibly life-threatening side effects and conditions.  I recommended and prescribed rescue medication to be given if shaking  lasts > 5 minutes.    We discussed medication side effects and activity precautions. We discussed symptoms that would warrant urgent/emergent evaluation. They verbalized understanding and agreement.    Total time I spent caring for the patient was 41 minutes on the day of the encounter related to this visit, including chart review and documentation before and after the visit, including time spent during the visit, but not including separately reported activities or procedures.    Return in about 6 months (around 12/26/2025).       ~~~~~~~~~~~~~~~~~~~~~~~~~~~    CHIEF COMPLAINT / REASON FOR VISIT:    Chief Complaint   Patient presents with    Neurologic Problem    Seizures     HISTORY OBTAINED FROM:  Patient and wife  Chart review    HISTORY:  Buzz Garcia is a 49 year old male with seizures starting 4/18/2020.  Has had 3 major convulsions and then began having \"small ones\" with staring and not responding (wife calls them \"freezing\").  LEV was started and titrated upward.  Last convulsion was October 2021.  Lase seizure was a \"small one\" in Jan 2024.    Last seizure was 4/8/25, happened during sleep, attributed to traveling and he was attending work events and drinking more EtOH than usual.  Convulsion with legs extended, shaking for about 5 minutes, and then with 16 minutes of confusion afterward, he was wandering and trying to leave the house.  He doesn't remember it.    He has stopped EtOH completely since then, he feels much better.  Since then Dr. Toscano has increased LEV to 1500 mg BID.  No side effects on this, though he sleeps better at night, though not tired during the day.  He is being more careful about spacing dosing and taking breaks, and trying to sleep during travels kushal on airplane.  He is careful to take dosing on time, even when traveling (may go to [place], [place], sometimes domestically).     Previous medication trials:  None    No FH seizures    Driving status:  Driving    DATA REVIEWED:  As  documented in the history    April 2025  Neuro note ([facility])    June 2022  EEG unremarkable    Oct 2021  MRI brain unremarkable (I independently analyzed and interpreted the above, and I agree with the reading physician report(s).)  cEEG 24 hrs \"This was a mildly abnormal prolonged video EEG. Findings are concerning for underlying focal cortical irritability around the right frontotemporal electrodes. \"    PHYSICAL EXAMINATION:  /72   Pulse 74   Resp 16   Wt 227 lb (103 kg)   BMI 31.66 kg/m²     Gen: in NAD  MSE: nl attn/conc, nl language, nl fund of knowledge  CN: EOMI, VFF, nl facial mvmt, nl palate, nl tongue  Motor: 5/5 x4, no drift  DTR: 2+ BUE and BLE  Coord: nl FTN b/I  Gait: normal    No Known Allergies    Current medications:  Current Outpatient Medications on File Prior to Visit   Medication Sig Dispense Refill    levetiracetam 1000 MG Oral Tab Take 1 tablet (1,000 mg total) by mouth 2 (two) times daily. Take with 500 mg to equal 1500 mg  tablet 3    levETIRAcetam 500 MG Oral Tab Take 1 tablet (500 mg total) by mouth 2 (two) times daily. Take with 1000 mg to equal 1500 mg  tablet 3    minoxidil 2.5 MG Oral Tab Take 1 tablet (2.5 mg total) by mouth daily.      JANUMET  MG Oral Tab Take 1 tablet by mouth 2 (two) times daily.      glimepiride 1 MG Oral Tab Take 1 tablet (1 mg total) by mouth daily with breakfast.      lisinopril 10 MG Oral Tab Take 1 tablet (10 mg total) by mouth daily.      Multiple Vitamin (TAB-A-TRISH) Oral Tab Take 1 tablet by mouth daily.      BIOTIN MAXIMUM OR Take 1 tablet by mouth daily.       No current facility-administered medications on file prior to visit.        Past Medical History:    Diabetes (HCC)    Seizure disorder (HCC)       History reviewed. No pertinent surgical history.    Social History     Socioeconomic History    Marital status:    Tobacco Use    Smoking status: Never    Smokeless tobacco: Never   Vaping Use    Vaping status:  Never Used   Substance and Sexual Activity    Alcohol use: Yes     Alcohol/week: 5.0 standard drinks of alcohol     Types: 2 Glasses of wine, 3 Cans of beer per week     Comment: occasionally     Drug use: Never   Other Topics Concern    Caffeine Concern Yes     Comment: 2-4 cups of coffee/tea daily    Exercise Yes     Comment: 2x per week       History reviewed. No pertinent family history.    Kathrin Vilchis MD, FAES, FAAN  Board-Certified in Neurology, Epilepsy, and Clinical Neurophysiology  [facility] [address]

## 2025-06-26 NOTE — PATIENT INSTRUCTIONS
Instructions from Dr. Vilchis:       Seizure precaution information provided:  Until seizure-free / event-free for at least 6 consecutive months, the patient must take precautions, including but not limited to absolutely avoid driving; avoid the use of heavy machinery; avoid lifting more than 25 lbs; avoid alcohol and drug use; swim only with supervision (avoid lakes and oceans, and someone who knows your condition and can swim must be in the water with you), avoid tub baths, use only the back burners on the stove, avoid open flames/heights/rooftops; take precautions with children and infants; if exercising, avoid free weights and avoid treadmill, and have a  who knows your condition; avoid contact sports; use a helmet if riding bicycle.  Avoid common triggers such as sleep deprivation, low blood sugar, and missing medications.  Seizure rescue plan. I recommend to call ambulance if: shaking lasts > 5 minutes, if there is a reduction or cessation of breathing, the patient turns blue, or any other observer concern out of the ordinary for the patient's seizures.  Paramedics can also give rescue medication.  Every seizure does not require ER visit, especially if it is brief or within the usual pattern for the patient's seizures.  Discussed risks and benefits of treatment, including but not limited to medication side-effects, including possibly life-threatening side effects and conditions.  I recommended and prescribed rescue medication to be given if shaking lasts > 5 minutes.    ~~~~~~~~~~~~~~~~~~~~~~~     Refill policies:    Allow 2-3 business days for refills; controlled substances may take longer.  Contact your pharmacy at least 5 days prior to running out of medication and have them send an electronic request or submit request through the “request refill” option in your [employer] account.  Refills are not addressed on weekends; covering physicians do not authorize routine medications on weekends.  No narcotics  or controlled substances are refilled after noon on Fridays or by on call physicians.  By law, narcotics must be electronically prescribed.  A 30 day supply with no refills is the maximum allowed.  If your prescription is due for a refill, you may be due for a follow up appointment.  To best provide you care, patients receiving routine medications need to be seen at least once a year.  Patients receiving narcotic/controlled substance medications need to be seen at least once every 3 months.  In the event that your preferred pharmacy does not have the requested medication in stock (e.g. Backordered), it is your responsibility to find another pharmacy that has the requested medication available.  We will gladly send a new prescription to that pharmacy at your request.    Scheduling Tests:    If your physician has ordered radiology tests such as MRI or CT scans, please contact Central Scheduling at [phone] right away to schedule the test.  Once scheduled, the [facility] Centralized Referral Team will work with your insurance carrier to obtain pre-certification or prior authorization.  Depending on your insurance carrier, approval may take 3-10 days.  It is highly recommended patients assure they have received an authorization before having a test performed.  If test is done without insurance authorization, patient may be responsible for the entire amount billed.      Precertification and Prior Authorizations:  If your physician has recommended that you have a procedure or additional testing performed the [facility] Centralized Referral Team will contact your insurance carrier to obtain pre-certification or prior authorization.    You are strongly encouraged to contact your insurance carrier to verify that your procedure/test has been approved and is a COVERED benefit.  Although the [facility] Centralized Referral Team does its due diligence, the insurance carrier gives the disclaimer that \"Although the procedure is authorized, this  does not guarantee payment.\"    Ultimately the patient is responsible for payment.   Thank you for your understanding in this matter.  Paperwork Completion:  If you require FMLA or disability paperwork for your recovery, please make sure to either drop it off or have it faxed to our office at [phone]. Be sure the form has your name and date of birth on it.  The form will be faxed to our Forms Department and they will complete it for you.  There is a 25$ fee for all forms that need to be filled out.  Please be aware there is a 10-14 day turnaround time.  You will need to sign a release of information (ASHLEY) form if your paperwork does not come with one.  You may call the Forms Department with any questions at [phone].  Their fax number is [phone].

## (undated) NOTE — LETTER
10/23/21    Leona Rodriguez      To Whom It May Concern:     This letter has been written at the patient's request. The above patient was seen at [facility] for treatment of a medical condition from 10/21/2021 to 10/23/2021     The patient may return